# Patient Record
Sex: FEMALE | NOT HISPANIC OR LATINO | URBAN - METROPOLITAN AREA
[De-identification: names, ages, dates, MRNs, and addresses within clinical notes are randomized per-mention and may not be internally consistent; named-entity substitution may affect disease eponyms.]

---

## 2018-06-02 ENCOUNTER — EMERGENCY (EMERGENCY)
Facility: HOSPITAL | Age: 58
LOS: 0 days | Discharge: HOME | End: 2018-06-02
Attending: EMERGENCY MEDICINE | Admitting: EMERGENCY MEDICINE
Payer: OTHER MISCELLANEOUS

## 2018-06-02 VITALS
TEMPERATURE: 98 F | OXYGEN SATURATION: 100 % | HEART RATE: 99 BPM | DIASTOLIC BLOOD PRESSURE: 75 MMHG | SYSTOLIC BLOOD PRESSURE: 106 MMHG | RESPIRATION RATE: 18 BRPM

## 2018-06-02 VITALS — WEIGHT: 130.07 LBS | HEIGHT: 64 IN
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
## 2018-06-02 DIAGNOSIS — S93.402A SPRAIN OF UNSPECIFIED LIGAMENT OF LEFT ANKLE, INITIAL ENCOUNTER: ICD-10-CM

## 2018-06-02 DIAGNOSIS — S80.211A ABRASION, RIGHT KNEE, INITIAL ENCOUNTER: ICD-10-CM
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
## 2018-06-02 PROCEDURE — 99283 EMERGENCY DEPT VISIT LOW MDM: CPT

## 2018-06-02 PROCEDURE — 29515 APPLICATION SHORT LEG SPLINT: CPT

## 2018-06-02 PROCEDURE — 73610 X-RAY EXAM OF ANKLE: CPT | Mod: 26,RT

## 2018-06-02 RX ORDER — IBUPROFEN 200 MG
600 TABLET ORAL ONCE
Qty: 0 | Refills: 0 | Status: COMPLETED | OUTPATIENT
Start: 2018-06-02 | End: 2018-06-02

## 2018-06-02 RX ADMIN — Medication 600 MILLIGRAM(S): at 22:19

## 2018-06-02 NOTE — ED STATDOCS - OBJECTIVE STATEMENT
58 year old female with no PMHx presents to ED reporting a mechanical trip and fall while at work earlier today.  She scraped her R knee and twisted her L ankle.  She is able to ambulate but her ankle is getting more swollen and painful as the day goes on.  She did not take anything for pain control.  She is up to date on tetanus. 58 year old female with no PMHx presents to ED reporting a mechanical trip and fall while at work earlier today.  She scraped her R knee and twisted her L ankle.  She is able to ambulate but her ankle is getting more swollen and painful as the day goes on.  She did not take anything for pain control.  She is up to date on tetanus. Pain is described as sharp, non radiating, associated with no other symptoms, mild to moderate in intensity, constant and gradually worsening, onset as described, alleviated with rest.

## 2018-06-02 NOTE — ED STATDOCS - PROGRESS NOTE DETAILS
xray negative.  reviewed RICE and motrin for pain control and recommended ortho follow up. patient placed in air cast.  -Macy Pablo PA-C Betty GUZMÁN: I agree with PA's physical exam findings. I have amended/modified the physical exam portion to reflect my findings. Nontoxic appearing adult with left ankle with mild swelling and TTP over lateral malleolus. 2+ dorsalis pedis pulses. FROM and strength, sensation intact and brisk cap refill.  All other joints unremarkable with FROM and strength..

## 2018-06-02 NOTE — ED STATDOCS - ATTENDING CONTRIBUTION TO CARE
I Lul Hernádnez MD saw and examined the patient. MLP saw and examined the patient under my supervision. I discussed the care of the patient with MLP and agree with MLP's plan, assessment and care of the patient while in the ED.

## 2018-06-02 NOTE — ED STATDOCS - MEDICAL DECISION MAKING DETAILS
Betty GUZMÁN: Outpatient follow up, xray negative to my evaluation. Follow up with ortho or podiatry recommended.

## 2018-06-02 NOTE — ED STATDOCS - MUSCULOSKELETAL, MLM
L ankle with swelling and TTP over lateral malleolus.  FROM and strength, sensation intact and brisk cap refill.  All other joints unremarkable with FROM and strength..

## 2018-06-03 NOTE — ED ADULT NURSE NOTE - CHPI ED SYMPTOMS NEG
no numbness/no tingling/no deformity/no abrasion/no back pain/no bruising/no fever/no stiffness/no weakness

## 2018-06-03 NOTE — ED ADULT NURSE NOTE - OBJECTIVE STATEMENT
58 year old female presenting to the ED with pain to her left ankle status post a twisting injury at work. Patient states that she had a mechanical fall earlier at work and that she twisted her left ankle and scrapped her right knee. Patient reporting moderate 4/10 aching pain to her left ankle. Patient is able to ambulate. No gross injury or deformity noted. Patient see and treated by RICHARD Cavazos, please refer to PA note.

## 2019-01-14 ENCOUNTER — EMERGENCY (EMERGENCY)
Facility: HOSPITAL | Age: 59
LOS: 0 days | Discharge: HOME | End: 2019-01-14
Attending: EMERGENCY MEDICINE | Admitting: EMERGENCY MEDICINE
Payer: COMMERCIAL

## 2019-01-14 VITALS
TEMPERATURE: 98 F | RESPIRATION RATE: 18 BRPM | HEART RATE: 90 BPM | SYSTOLIC BLOOD PRESSURE: 112 MMHG | OXYGEN SATURATION: 100 % | HEIGHT: 66 IN | WEIGHT: 139.99 LBS | DIASTOLIC BLOOD PRESSURE: 71 MMHG

## 2019-01-14 DIAGNOSIS — H57.89 OTHER SPECIFIED DISORDERS OF EYE AND ADNEXA: ICD-10-CM

## 2019-01-14 PROCEDURE — 99283 EMERGENCY DEPT VISIT LOW MDM: CPT

## 2019-01-14 RX ORDER — FLUORESCEIN SODIUM 9 MG
1 STRIP OPHTHALMIC (EYE) ONCE
Qty: 0 | Refills: 0 | Status: COMPLETED | OUTPATIENT
Start: 2019-01-14 | End: 2019-01-14

## 2019-01-14 RX ADMIN — Medication 1 APPLICATION(S): at 04:24

## 2019-01-14 RX ADMIN — Medication 1 DROP(S): at 04:24

## 2019-01-14 NOTE — ED PROVIDER NOTE - NSFOLLOWUPINSTRUCTIONS_ED_ALL_ED_FT
please follow up with your eye doctor in 1-2 days.   use artifical tears as directed.   flush eyes.   return to ED for any concerns

## 2019-01-14 NOTE — ED PROVIDER NOTE - MEDICAL DECISION MAKING DETAILS
pt with bilateral eye irritation.   improved with tetracaine.   advised f/u with PMD and use artifical tears.

## 2019-01-14 NOTE — ED ADULT NURSE NOTE - NSIMPLEMENTINTERV_GEN_ALL_ED
Implemented All Universal Safety Interventions:  [place] to call system. Call bell, personal items and telephone within reach. Instruct patient to call for assistance. Room bathroom lighting operational. Non-slip footwear when patient is off stretcher. Physically safe environment: no spills, clutter or unnecessary equipment. Stretcher in lowest position, wheels locked, appropriate side rails in place.

## 2019-01-14 NOTE — ED ADULT TRIAGE NOTE - CHIEF COMPLAINT QUOTE
Brought in by EMS for bilateral eye pain. Patient states that at 2100 on 1/13 she "got some facial cleanser in my eyes," states she washed it out with water and saline solution without improvement, woke up from sleep with blurry vision and eye pain.

## 2019-01-14 NOTE — ED ADULT NURSE NOTE - OBJECTIVE STATEMENT
pt presents to ED via EMS for bilateral eye pain/blurry vision. pt states at around 9pm 1/13/19 she was washing her face with "body wash that I always use" and that she "felt like my eyes had a film over them from the soap". pt stated her symptoms persisted and came to the ed for evaluation. pt endorses bilateral eye pain, "blurry vision". pt able to open eyes without difficulty. pt denies any history except scratched cornea in past. pt in no distress, breathing even and labored. pt denies headache/dizziness. pt's vss.

## 2019-01-14 NOTE — ED PROVIDER NOTE - OBJECTIVE STATEMENT
57 y/o female in ED c/o bilateral eye irritation tonight.   pt states used an eczema facial wash tonight and may have gotten some in eyes.   states was using visine with no relief.   pt denies any fever, HA, cp, sob, n/v/d/abd pain.    tolerating PO.   no sick contacts or recent travel.

## 2020-05-13 ENCOUNTER — TRANSCRIPTION ENCOUNTER (OUTPATIENT)
Age: 60
End: 2020-05-13

## 2021-12-08 ENCOUNTER — OUTPATIENT (OUTPATIENT)
Dept: OUTPATIENT SERVICES | Facility: HOSPITAL | Age: 61
LOS: 1 days | End: 2021-12-08

## 2023-09-18 NOTE — ED PROVIDER NOTE - NS ED MD DISPO DISCHARGE
Outpatient Pelvic Health Physical Therapy Progress/Daily Note       Patient Name: Trinidad Patel  Clinic Number: [id number]    Therapy Diagnosis:   Encounter Diagnoses   Name Primary?    Vaginismus Yes    Urge incontinence of urine      Physician: Levar Bhatti PA*    Visit Date: 9/19/2023    Physician Orders: PT Eval and Treat   Medical Diagnosis from Referral: Disorder of muscle [M62.9], Urge incontinence of urine [N39.41]  Evaluation Date: 8/17/2023  Authorization Period Expiration: 12/31/2023  Plan of Care Expiration: 10/26/2023  Progress Note Due: 10/19/2023  Visit # / Visits authorized: 2/ 20   FOTO: Issued Visit #: 2/3      Precautions: Standard     Time In: 4:30 pm  Time Out: 5:18 pm  Total Appointment Time (timed & untimed codes): 48 minutes    SUBJECTIVE   Pt reports: she did two of her exercises this week maybe twice. She did not have another visit with her psychologist and in unsure when her next appointment is. She is having urinary leakage at night time again. She has not been filling her pads up with urine at school and has been going more at school. She is tired today. Her anxiety has been really bad lately.     She  was part  compliant with home exercise program.  Response to previous treatment: no adverse events  Functional change: no change    Pain: 0/10 on VAS scale  Pain location: pelvic    OBJECTIVE   See EMR under MEDIA for written consent provided 8/17/2023  Chaperone: declined    FOTO survey provided 09/19/2023  Decreased adductor tone  Abdominal tenderness  Decreased functional/dynamic hip extensor strength     VAGINAL PELVIC FLOOR EXAM    Declined exam today as her anxiety has been bad lately       Therapeutic Exercise to develop  strength, endurance, ROM, flexibility, posture, and core stabilization for 48 minutes including:     Butterfly stretch in sitting 10 x 10 second holds  Diaphragmatic breathing with 2 x 10 breaths for eccentric transverse contraction  Child's pose with  diaphragmatic breathing 3 x 10  Happy baby with deep breathing x 2 minutes  Double leg shuttle press 4 x 10, #125 exhale on the press  Squats with exhale 2 x 30 body weight    Neuromuscular Re-education to develop down training, coordination, balance, and posture for 0 minutes including:      Manual Therapy to develop flexibility, extensibility, and desensitization for 0 minutes including:    Bladder mobilizations  Psoas release bilaterally  Myofascial release of adductors bilaterally (R>L)    Education provided:   general anatomy/physiology of urinary/ bowel  system and benefits of treatment were discussed with the patient. Additionally, bladder irritants, anatomy/physiology of pelvic floor, posture/body mechanices, vulvar irritants, hygiene of pelvic floor, bladder retraining, diaphragmatic breathing, double voiding techniques, proper bearing down techniques, fluid intake/dietary modifications, and behavior modifications were reviewed. Patient provided with additional education on importance of following up with therapist, gaining full understanding of medications she is taking for appropriate use of prescriptions provided by medical doctors, creating a list of follow up tasks to be completed, appropriate use of public restrooms, and education on avoiding risk of recurrent urinary tract infections such as holding in urine at school all day.   - pt provided with another print out of initial home exercise program for increased adherence/increased therapeutic benefit     Written Home Exercises provided: yes.  Exercises were reviewed and Trinidad was able to demonstrate them prior to the end of the session. Trinidad demonstrated good  understanding of the education provided. See EMR under Patient Instructions for exercises provided during therapy sessions.       ASSESSMENT      Pt tolerated hip strengthening in conjunction with pelvic floor relaxation tasks very well. However, hip flexion increases symptoms of bladder  pain unless she remembers to exhale at the appropriate time. Again, discussed importance of following up with mental health provider (therapist she recently visited with) for optimal therapeutic benefit. Patient will benefit from skilled physical therapy in conjunction with other care providers to address remaining pelvic floor dysfunction.     Will the patient continue to benefit from skilled PT intervention? Yes  Medical necessity demonstrated by: continued incontinence and pelvic floor dysfunction  Progress towards goals:  good    Patient prognosis is Fair.   Patient will benefit from skilled outpatient Physical Therapy to address the deficits stated above and in the chart below, provide patient/family education, and to maximize patient's level of independence.      Plan of care discussed with patient: Yes  Patient's spiritual, cultural and educational needs considered and patient is agreeable to the plan of care and goals as stated below:      Anticipated Barriers for therapy: history of poor adherence to follow up appointments     Goals: (progressing 09/12/2023)  Short Term Goals: 6 weeks   - Pt will demonstrate excellent knowledge and adherence to HEP to facilitate optimal recovery. (MET 09/19/2023)  - Pt will demonstrate proper PFM contraction, relaxation, and lengthening coordinated with TA and breath for improved muscle coordination needed for functional activity.  (MET 09/19/2023)  - Pt will verbalize understanding of appropriate voiding intervals to decrease likelihood of recurrent urinary tract infections.  (MET 09/19/2023)  - Pt will report regular use of bathroom at school for decreased likelihood of recurrent urinary tract infections  (MET 09/19/2023)  - Pt will report regular use of stool or squatty potty when having a bowel movement  (NOT MET 09/19/2023)     Long Term Goals: 12 weeks   - Pt will demonstrate excellent knowledge and adherence to HEP for continued self-maintenance of symptoms.  (NOT  MET 09/19/2023)  - Pt will report FOTO score of 45% limited or less indicating clinically relevant increase in function. (NOT MET 09/19/2023)  - Pt will report voiding interval of 2-3 hours for improved ADL tolerance.  (NOT MET 09/19/2023) (goes once during school)  - Pt will report ability to delay urinary urge for at least 10 minutes to maintain continence with ADL/IADLs.  (NOT MET 09/19/2023) 3 minutes  - Pt will report no incidence of urinary incontinence 5/7 days for improved hygiene and ADL/IADL tolerance.  (NOT MET 09/19/2023)  - Pt will report needing </= 2 pad/day indicating improved PFM function needed to maintain continence. (NOT MET 09/19/2023) 3 pads  - Pt will demonstrate PFM strength of at least 3/5 MMT for improved strength needed to maintain continence.  (NOT MET 09/19/2023)  - Pt will report bearing down appropriately 100% of the time for improved bowel function and decreased stress on adjacent pelvic structures.  (NOT MET 09/19/2023)  - Pt will report ability to tolerate speculum exam without pain for improved access to healthcare. (NOT MET 09/19/2023)       PLAN   Plan of care Certification: 8/17/2023 to 10/26/2023    Continue with established Plan of Care, working toward established PT goals. Continue down training challenges.     Aimee Nagel, PT, DPT  09/19/2023     Home

## 2025-05-11 ENCOUNTER — NON-APPOINTMENT (OUTPATIENT)
Age: 65
End: 2025-05-11